# Patient Record
Sex: FEMALE | Race: WHITE
[De-identification: names, ages, dates, MRNs, and addresses within clinical notes are randomized per-mention and may not be internally consistent; named-entity substitution may affect disease eponyms.]

---

## 2018-06-01 ENCOUNTER — HOSPITAL ENCOUNTER (INPATIENT)
Dept: HOSPITAL 74 - FM/S | Age: 73
LOS: 3 days | Discharge: NURSING, REHAB OR LONG-TERM CARE | DRG: 462 | End: 2018-06-04
Attending: NURSE PRACTITIONER | Admitting: INTERNAL MEDICINE
Payer: COMMERCIAL

## 2018-06-01 VITALS — BODY MASS INDEX: 33.1 KG/M2

## 2018-06-01 DIAGNOSIS — Z88.0: ICD-10-CM

## 2018-06-01 DIAGNOSIS — M17.0: Primary | ICD-10-CM

## 2018-06-01 DIAGNOSIS — E83.42: ICD-10-CM

## 2018-06-01 DIAGNOSIS — D64.9: ICD-10-CM

## 2018-06-01 DIAGNOSIS — E78.00: ICD-10-CM

## 2018-06-01 DIAGNOSIS — I10: ICD-10-CM

## 2018-06-01 DIAGNOSIS — E03.9: ICD-10-CM

## 2018-06-01 PROCEDURE — 0SRD0J9 REPLACEMENT OF LEFT KNEE JOINT WITH SYNTHETIC SUBSTITUTE, CEMENTED, OPEN APPROACH: ICD-10-PCS | Performed by: ORTHOPAEDIC SURGERY

## 2018-06-01 PROCEDURE — 0SRC0J9 REPLACEMENT OF RIGHT KNEE JOINT WITH SYNTHETIC SUBSTITUTE, CEMENTED, OPEN APPROACH: ICD-10-PCS | Performed by: ORTHOPAEDIC SURGERY

## 2018-06-01 PROCEDURE — 8E0Y0CZ ROBOTIC ASSISTED PROCEDURE OF LOWER EXTREMITY, OPEN APPROACH: ICD-10-PCS | Performed by: ORTHOPAEDIC SURGERY

## 2018-06-01 RX ADMIN — DOCUSATE SODIUM,SENNOSIDES SCH TABLET: 50; 8.6 TABLET, FILM COATED ORAL at 21:28

## 2018-06-01 RX ADMIN — ACETAMINOPHEN SCH: 10 INJECTION, SOLUTION INTRAVENOUS at 21:00

## 2018-06-01 RX ADMIN — ACETAMINOPHEN SCH: 10 INJECTION, SOLUTION INTRAVENOUS at 16:44

## 2018-06-01 RX ADMIN — CEFAZOLIN SODIUM SCH MLS/HR: 2 SOLUTION INTRAVENOUS at 18:12

## 2018-06-01 RX ADMIN — HYDROCHLOROTHIAZIDE SCH MG: 12.5 CAPSULE ORAL at 21:28

## 2018-06-01 RX ADMIN — ATORVASTATIN CALCIUM SCH MG: 10 TABLET, FILM COATED ORAL at 21:28

## 2018-06-01 RX ADMIN — GABAPENTIN SCH MG: 300 CAPSULE ORAL at 21:28

## 2018-06-01 RX ADMIN — LISINOPRIL SCH MG: 20 TABLET ORAL at 21:28

## 2018-06-01 RX ADMIN — ACETAMINOPHEN SCH MG: 325 TABLET ORAL at 21:28

## 2018-06-01 RX ADMIN — ASPIRIN 325 MG ORAL TABLET SCH MG: 325 PILL ORAL at 21:28

## 2018-06-01 RX ADMIN — OXYCODONE HYDROCHLORIDE AND ACETAMINOPHEN SCH MG: 500 TABLET ORAL at 21:28

## 2018-06-01 RX ADMIN — OXYCODONE HYDROCHLORIDE SCH MG: 10 TABLET, FILM COATED, EXTENDED RELEASE ORAL at 21:29

## 2018-06-01 NOTE — SURG
Surgery First Assist Note


First Assist: Cresencio Mansfield PA-C


Date of Service: 06/01/18


Diagnosis: 





Osteoarthritis





Procedure: 





[place] bilateral total knee replacements


I was present for the entirety of the operative procedure. For further detail, 

please refer to operative report.








Visit type





- Case Type


Case Type: Scheduled





- New patient


This patient is new to me today: Yes


Date on this admission: 06/01/18

## 2018-06-01 NOTE — HP
History & Physical Update





- History


History: No Change





- Physical


Physical: No Change





- Assessment


Assessment: No Change





- Plan


Plan: No Change (Initial H&P is located in patient's paper chart. Her today for 

elective bilateral knee replacements (primary OA). No new complaints or 

medications since completing H&P.)

## 2018-06-01 NOTE — OP
DATE OF OPERATION:  06/01/2018

 

PREOPERATIVE DIAGNOSIS:  Bilateral knee arthritis.

 

POSTOPERATIVE DIAGNOSIS:  Bilateral knee arthritis.

 

OPERATION PERFORMED:  Bilateral total knee arthroplasty with LEYDA assistance.

 

SURGEON:  Toy Patton MD

 

ASSISTANT:  DIVYA Dupont

 

TYPE OF ANESTHESIA:  Spinal and block.

 

DISPOSITION:  Patient returned to the recovery room in stable condition.

 

COMPONENTS USED:  On both sides, we used a size 4 Abebe Triathlon femur, a size 4

Abebe Triathlon universal tibia, and a 29-mm patella.  On the left side, we used an

11-mm PS insert, and on the right side, we used a 9-mm PS insert.

 

DRAINS PLACED:  One Hemovac each side.

 

ESTIMATED BLOOD LOSS:  Less than 50 mL each side.

 

TOTAL TOURNIQUET TIME:  On the left was 119 minutes; on the right was 110 minutes.

 

PREOPERATIVE CONTRACTURE:  10 degrees on both sides.

 

FINDINGS:  Severe end-stage arthritis.

 

INDICATIONS:  Patient failed nonoperative treatment for bilateral knee arthritis and

was indicated for bilateral total knee replacement.  Preoperatively in the waiting

area as well as in the operative site, a long discussion with the patient regarding

the plan, the expected outcome, and the risks, benefits, and alternatives of surgery.

 The risks included, but were not limited to infection which may require future

surgery and removal of implants, bleeding which may require transfusion, damage to

nerves, arteries, veins, tendons, muscles, and other adjacent structures leading to

possible numbness, weakness, decreased function and/or possible need for surgical

repair.  Also discussed were the possibility of intraoperative and postoperative

fractures, implant loosening, stiffness, need for extensive therapy, and need for

revision for a variety of reasons.  We also discussed blood clots and other possible

medical complications.  We also discussed that doing both knees at the same time

places her at increased risk for several complications including blood clots,

bleeding, needing for transfusion, and difficult recovery.  All questions were

answered, and consent was obtained.

 

PROCEDURE IN DETAIL:  Patient was taken to the operating room and placed on the

operating table in the supine position.  Following induction of spinal anesthesia,

well-padded tourniquets were placed high on both sides, and both lower extremities

were prepped and draped in a sterile fashion.  A timeout was performed that confirmed

the correct sides, verified that the sites were marked, and confirmed the correct

patient and procedures to be performed, as well that the patient received the

appropriate preoperative antibiotics and tranexamic acid and was registered in the

[employer] robot.  The identical procedure was performed on both sides, only difference

being the tourniquet times noted above and the insert sides noted above.  Tourniquet

was elevated, followed by a midline incision and a medial parapatellar arthrotomy. 

The checkpoints were placed in the femur and tibia through stab incisions, and after

blunt dissection, the arrays were placed on the tibia and the femur.  The patient was

then verified and registered in the computer.  The plan was adjusted according to

patient's anatomy and alignment, and we then made all of our cuts using the [employer]

robot while protecting the surrounding soft tissues.  A laminar  was placed

sequentially in the medial and lateral joint spaces.  Posterior osteophytes, cruciate

ligaments, and menisci were all excised.  We then placed trials of our final sizes

after cutting the box, and there was good stability, range of motion, soft tissue

tension, and patellar tracking with our trials of our final sizes.  We then measured

the patella on both sides to be 23.  We cut off 8.25 using a guide.  We reconstructed

both patella with a 29-mm button and performed lateral double cuts.  When the patella

trial was in place, the patellar tracked centrally.  There was good stability, soft

tissue tension, and range of motion, and we verified our final alignment in

accordance with our preoperative plan with the robot.  The tibia was punched, and the

trials were then removed.  The wound was copiously irrigated, and sequential

cementation was performed starting with the tibia and then the femur.  Excess cement

was removed.  We inserted the final polyethylene, and it seated flush.  The knee was

brought to full extension to allow the cement to harden.  We made sure excess cement

was removed.  After a 3-minute soak with a diluted Betadine solution, the knee was

copiously irrigated.  Our injection was then injected.  A deep drain was placed.  We

checked for excess cement.  The arthrotomy was closed with number 1 Vicryl and 0

V-Loc.  With the arthrotomy closed, there was good stability, range of motion, soft

tissue tension, and the patella tracked centrally.  An anesthetic cocktail had

already been injected.  Next, 2-0 Vicryl and staples were used for the skin.  The

knee was wrapped in a dry, sterile compressive dressing.  The tourniquet was

released.  Compartments were soft at the end of the procedure.  Pulses were palpated,

and the patient was returned to the recovery room in stable condition, will be

mobilized weightbearing as tolerated.

 

WOUND CLASSIFICATION:  Clean.

 

SPECIMENS:  Bone.

 

COMPLICATIONS:  None known.

 

Of note, we removed the checkpoints and arrays prior to closure.

 

 

TOY PATTON M.D.

 

[id number]

DD: 06/01/2018 20:10

DT: 06/01/2018 20:54

Job #:  [id number]

## 2018-06-01 NOTE — OP
Operative Note





- Note:


Operative Date: 06/01/18


Pre-Operative Diagnosis: Osteoarthritis


Operation: LEYDA bilateral knee replacements


Post-Operative Diagnosis: Same as Pre-op


Surgeon: Toy Willoughby


Assistant: Cresencio Mansfield


Anesthesia: Spinal


Estimated Blood Loss (mls): 100


Drains & Tubes with Location: hemovac x2


Drains, Volume Out (mls): 400 (forrest)


Fluid Volume Replaced (mls): 2,300


Operative Report Dictated: Yes

## 2018-06-02 LAB
ALBUMIN SERPL-MCNC: 3.2 G/DL (ref 3.5–5)
ALP SERPL-CCNC: 47 U/L (ref 32–92)
ALT SERPL-CCNC: 19 U/L (ref 10–40)
ANION GAP SERPL CALC-SCNC: 8 MMOL/L (ref 8–16)
ANION GAP SERPL CALC-SCNC: 9 MMOL/L (ref 8–16)
AST SERPL-CCNC: 24 U/L (ref 10–42)
BASOPHILS # BLD: 0.2 % (ref 0–2)
BILIRUB SERPL-MCNC: 0.6 MG/DL (ref 0.2–1)
BUN SERPL-MCNC: 23 MG/DL (ref 7–18)
BUN SERPL-MCNC: 24 MG/DL (ref 7–18)
CALCIUM SERPL-MCNC: 8 MG/DL (ref 8.4–10.2)
CALCIUM SERPL-MCNC: 8.1 MG/DL (ref 8.4–10.2)
CHLORIDE SERPL-SCNC: 99 MMOL/L (ref 98–107)
CHLORIDE SERPL-SCNC: 99 MMOL/L (ref 98–107)
CO2 SERPL-SCNC: 24 MMOL/L (ref 22–28)
CO2 SERPL-SCNC: 25 MMOL/L (ref 22–28)
CREAT SERPL-MCNC: 0.9 MG/DL (ref 0.6–1.3)
CREAT SERPL-MCNC: 0.9 MG/DL (ref 0.6–1.3)
DEPRECATED RDW RBC AUTO: 13 % (ref 11.6–15.6)
DEPRECATED RDW RBC AUTO: 13.7 % (ref 11.6–15.6)
EOSINOPHIL # BLD: 0.9 % (ref 0–4.5)
GLUCOSE SERPL-MCNC: 150 MG/DL (ref 74–106)
GLUCOSE SERPL-MCNC: 152 MG/DL (ref 74–106)
HCT VFR BLD CALC: 27.4 % (ref 32.4–45.2)
HCT VFR BLD CALC: 28 % (ref 32.4–45.2)
HGB BLD-MCNC: 9.6 GM/DL (ref 10.7–15.3)
HGB BLD-MCNC: 9.6 GM/DL (ref 10.7–15.3)
LYMPHOCYTES # BLD: 12 % (ref 8–40)
MAGNESIUM SERPL-MCNC: 1.6 MG/DL (ref 1.8–2.4)
MCH RBC QN AUTO: 32.1 PG (ref 25.7–33.7)
MCH RBC QN AUTO: 32.5 PG (ref 25.7–33.7)
MCHC RBC AUTO-ENTMCNC: 34.3 G/DL (ref 32–36)
MCHC RBC AUTO-ENTMCNC: 34.9 G/DL (ref 32–36)
MCV RBC: 93.1 FL (ref 80–96)
MCV RBC: 93.6 FL (ref 80–96)
MONOCYTES # BLD AUTO: 9 % (ref 3.8–10.2)
NEUTROPHILS # BLD: 77.9 % (ref 42.8–82.8)
PLATELET # BLD AUTO: 184 K/MM3 (ref 134–434)
PLATELET # BLD AUTO: 190 K/MM3 (ref 134–434)
PMV BLD: 7.2 FL (ref 7.5–11.1)
PMV BLD: 8.3 FL (ref 7.5–11.1)
POTASSIUM SERPLBLD-SCNC: 3.7 MMOL/L (ref 3.5–5.1)
POTASSIUM SERPLBLD-SCNC: 3.8 MMOL/L (ref 3.5–5.1)
PROT SERPL-MCNC: 5.1 G/DL (ref 6.4–8.3)
RBC # BLD AUTO: 2.94 M/MM3 (ref 3.6–5.2)
RBC # BLD AUTO: 2.99 M/MM3 (ref 3.6–5.2)
SODIUM SERPL-SCNC: 132 MMOL/L (ref 136–145)
SODIUM SERPL-SCNC: 132 MMOL/L (ref 136–145)
WBC # BLD AUTO: 6.8 K/MM3 (ref 4–10.8)
WBC # BLD AUTO: 6.9 K/MM3 (ref 4–10.8)

## 2018-06-02 RX ADMIN — ACETAMINOPHEN SCH MG: 325 TABLET ORAL at 08:30

## 2018-06-02 RX ADMIN — OXYCODONE HYDROCHLORIDE SCH MG: 10 TABLET, FILM COATED, EXTENDED RELEASE ORAL at 10:04

## 2018-06-02 RX ADMIN — ACETAMINOPHEN SCH MG: 325 TABLET ORAL at 14:37

## 2018-06-02 RX ADMIN — ASPIRIN 325 MG ORAL TABLET SCH MG: 325 PILL ORAL at 10:03

## 2018-06-02 RX ADMIN — ASPIRIN 325 MG ORAL TABLET SCH MG: 325 PILL ORAL at 21:32

## 2018-06-02 RX ADMIN — ACETAMINOPHEN SCH MG: 325 TABLET ORAL at 03:30

## 2018-06-02 RX ADMIN — OXYCODONE HYDROCHLORIDE AND ACETAMINOPHEN SCH MG: 500 TABLET ORAL at 10:06

## 2018-06-02 RX ADMIN — ACETAMINOPHEN SCH: 10 INJECTION, SOLUTION INTRAVENOUS at 03:30

## 2018-06-02 RX ADMIN — VITAMIN D, TAB 1000IU (100/BT) SCH UNIT: 25 TAB at 10:06

## 2018-06-02 RX ADMIN — GABAPENTIN SCH MG: 300 CAPSULE ORAL at 21:32

## 2018-06-02 RX ADMIN — OXYCODONE HYDROCHLORIDE SCH MG: 10 TABLET, FILM COATED, EXTENDED RELEASE ORAL at 21:33

## 2018-06-02 RX ADMIN — CELECOXIB SCH MG: 200 CAPSULE ORAL at 10:03

## 2018-06-02 RX ADMIN — DOCUSATE SODIUM,SENNOSIDES SCH TABLET: 50; 8.6 TABLET, FILM COATED ORAL at 10:04

## 2018-06-02 RX ADMIN — PANTOPRAZOLE SODIUM SCH MG: 40 TABLET, DELAYED RELEASE ORAL at 10:05

## 2018-06-02 RX ADMIN — OXYCODONE HYDROCHLORIDE AND ACETAMINOPHEN SCH MG: 500 TABLET ORAL at 21:32

## 2018-06-02 RX ADMIN — LISINOPRIL SCH MG: 20 TABLET ORAL at 10:04

## 2018-06-02 RX ADMIN — ATORVASTATIN CALCIUM SCH MG: 10 TABLET, FILM COATED ORAL at 21:32

## 2018-06-02 RX ADMIN — LISINOPRIL SCH MG: 20 TABLET ORAL at 21:32

## 2018-06-02 RX ADMIN — MULTIVITAMIN TABLET SCH TAB: TABLET at 10:05

## 2018-06-02 RX ADMIN — DOCUSATE SODIUM,SENNOSIDES SCH TABLET: 50; 8.6 TABLET, FILM COATED ORAL at 21:33

## 2018-06-02 RX ADMIN — HYDROCHLOROTHIAZIDE SCH MG: 12.5 CAPSULE ORAL at 21:32

## 2018-06-02 RX ADMIN — HYDROCHLOROTHIAZIDE SCH MG: 12.5 CAPSULE ORAL at 10:04

## 2018-06-02 RX ADMIN — LEVOTHYROXINE SODIUM SCH MCG: 112 TABLET ORAL at 07:19

## 2018-06-02 RX ADMIN — ACETAMINOPHEN SCH MG: 325 TABLET ORAL at 21:31

## 2018-06-02 RX ADMIN — CEFAZOLIN SODIUM SCH MLS/HR: 2 SOLUTION INTRAVENOUS at 00:20

## 2018-06-02 RX ADMIN — GABAPENTIN SCH MG: 300 CAPSULE ORAL at 10:04

## 2018-06-02 NOTE — PN
Progress Note (short form)





- Note


Progress Note: 





Patient seen and examined.  No acute events.  Pain controlled. 


Vitals reviewed and stable


No acute distress


A and Ox3


BL drains removed by me


Dressings dry


compartments soft


NVID





A/P: POD#1 s/p bl tka


-dvt proph w asa and mechanical


-pt/ot wbat


-pain control


-dispo: likely Acute rehab at [place] monday if approved.

## 2018-06-02 NOTE — PN
Physical Exam: 


SUBJECTIVE: Patient seen and examined at the bedside.


In no acute distress





OBJECTIVE:





 Vital Signs











 Period  Temp  Pulse  Resp  BP Sys/Grewal  Pulse Ox


 


 Last 24 Hr  93.9 F-98.1 F  56-71  16-18  /46-66  








GENERAL: The patient is awake, alert, and fully oriented, in no acute distress.


HEAD: Normal with no signs of trauma.


EYES: PERRL, extraocular movements intact, sclera anicteric, conjunctiva clear. 

No ptosis. 


ENT: Ears normal, nares patent, oropharynx clear without exudates, moist mucous 

membranes.


NECK: Trachea midline, full range of motion, supple. 


LUNGS: Breath sounds equal, clear to auscultation bilaterally, no wheezes, no 

crackles, no accessory muscle use. 


HEART: Regular rate and rhythm


ABDOMEN: Soft, nontender, nondistended, normoactive bowel sounds, no guarding


EXTREMITIES: She is POD #1 for bilateral TKA.  She is awaiting rehab at [place]. 


NEUROLOGICAL: Cranial nerves II through XII grossly intact. Normal speech, gait 

not observed.


PSYCH: Normal mood, normal affect.


SKIN: Warm, dry, normal turgor, no rashes or lesions noted





 Laboratory Results - last 24 hr











  06/02/18 06/02/18 06/02/18





  08:00 08:00 09:30


 


WBC  6.8   6.9


 


RBC  2.99 L   2.94 L


 


Hgb  9.6 L   9.6 L


 


Hct  28.0 L   27.4 L


 


MCV  93.6   93.1


 


MCH  32.1   32.5


 


MCHC  34.3   34.9


 


RDW  13.7   13.0


 


Plt Count  190   184


 


MPV  7.2 L   8.3


 


Neutrophils %    77.9


 


Lymphocytes %    12.0


 


Monocytes %    9.0


 


Eosinophils %    0.9


 


Basophils %    0.2


 


Sodium   132 L 


 


Potassium   3.7 


 


Chloride   99 


 


Carbon Dioxide   25 


 


Anion Gap   8 


 


BUN   24 H 


 


Creatinine   0.9 


 


Creat Clearance w eGFR   


 


Random Glucose   152 H 


 


Calcium   8.1 L 


 


Magnesium   


 


Total Bilirubin   


 


AST   


 


ALT   


 


Alkaline Phosphatase   


 


Total Protein   


 


Albumin   














  06/02/18





  09:30


 


WBC 


 


RBC 


 


Hgb 


 


Hct 


 


MCV 


 


MCH 


 


MCHC 


 


RDW 


 


Plt Count 


 


MPV 


 


Neutrophils % 


 


Lymphocytes % 


 


Monocytes % 


 


Eosinophils % 


 


Basophils % 


 


Sodium  132 L


 


Potassium  3.8


 


Chloride  99


 


Carbon Dioxide  24


 


Anion Gap  9


 


BUN  23 H


 


Creatinine  0.9


 


Creat Clearance w eGFR  > 60


 


Random Glucose  150 H


 


Calcium  8.0 L


 


Magnesium  1.6 L


 


Total Bilirubin  0.6


 


AST  24


 


ALT  19


 


Alkaline Phosphatase  47


 


Total Protein  5.1 L


 


Albumin  3.2 L








Active Medications











Generic Name Dose Route Start Last Admin





  Trade Name Freq  PRN Reason Stop Dose Admin


 


Acetaminophen  650 mg  06/01/18 20:30  06/02/18 08:30





  Tylenol -  PO   650 mg





  Q6H ELLE   Administration





     





     





     





     


 


Al Hydroxide/Mg Hydroxide  30 ml  06/01/18 13:43  





  Mylanta Oral Suspension -  PO   





  Q4H PRN   





  DYSPEPSIA   





     





     





     


 


Ascorbic Acid  500 mg  06/01/18 22:00  06/02/18 10:06





  Vitamin C -  PO   500 mg





  BID ELLE   Administration





     





     





     





     


 


Aspirin  325 mg  06/01/18 22:00  06/02/18 10:03





  Asa -  PO   325 mg





  BID ELLE   Administration





     





     





     





     


 


Atorvastatin Calcium  10 mg  06/01/18 22:00  06/01/18 21:28





  Lipitor -  PO   10 mg





  HS ELLE   Administration





     





     





     





     


 


Celecoxib  200 mg  06/02/18 10:00  06/02/18 10:03





  Celebrex -  PO   200 mg





  DAILY ELLE   Administration





     





     





     





     


 


Cholecalciferol  2,000 unit  06/02/18 10:00  06/02/18 10:06





  Vitamin D3 -  PO   2,000 unit





  DAILY ELLE   Administration





     





     





     





     


 


Fentanyl  50 mcg  06/01/18 13:50  





  Sublimaze Injection -  IVPUSH   





  [id number] PRN   





  PAIN-PACU ORDER X 4 DOSES ONLY   





     





     





     


 


Gabapentin  300 mg  06/01/18 22:00  06/02/18 10:04





  Neurontin -  PO   300 mg





  BID ELLE   Administration





     





     





     





     


 


Hydrochlorothiazide  12.5 mg  06/01/18 22:00  06/02/18 10:04





  Hctz -  PO   12.5 mg





  BID ELLE   Administration





     





     





     





     


 


Levothyroxine Sodium 112 mcg/  137 mcg  06/02/18 07:00  06/02/18 07:19





  Levothyroxine Sodium 25 mcg  PO   137 mcg





  DAILY@0700 ELLE   Administration





     





     





     





     


 


Lisinopril  20 mg  06/01/18 22:00  06/02/18 10:04





  Prinivil  PO   20 mg





  BID ELLE   Administration





     





     





     





     


 


Metoprolol Succinate  50 mg  06/02/18 10:00  06/02/18 10:05





  Toprol Xl -  PO   50 mg





  DAILY ELLE   Administration





     





     





     





     


 


Multivitamins/Minerals/Vitamin C  1 tab  06/02/18 10:00  06/02/18 10:05





  Tab-A-Vit -  PO   1 tab





  DAILY ELLE   Administration





     





     





     





     


 


Ondansetron HCl  4 mg  06/01/18 13:43  





  Zofran Injection  IVPUSH   





  Q6H PRN   





  NAUSEA   





     





     





     


 


Oxycodone HCl  5 mg  06/01/18 13:51  06/02/18 10:05





  Roxicodone -  PO   5 mg





  Q3H PRN   Administration





  PAIN LEVEL 1-5   





     





     





     


 


Oxycodone HCl  10 mg  06/01/18 13:51  





  Roxicodone -  PO   





  Q3H PRN   





  PAIN LEVEL 6-10   





     





     





     


 


Oxycodone HCl  10 mg  06/01/18 22:00  06/02/18 10:04





  Oxycontin -  PO  06/04/18 13:52  10 mg





  BID ELLE   Administration





     





     





     





     


 


Pantoprazole Sodium  40 mg  06/02/18 10:00  06/02/18 10:05





  Protonix -  PO   40 mg





  DAILY ELLE   Administration





     





     





     





     


 


Senna/Docusate Sodium  1 tablet  06/01/18 22:00  06/02/18 10:04





  Pericolace -  PO   1 tablet





  BID ELLE   Administration





     





     





     





     











ASSESSMENT/PLAN:





Patient is a 73 year old female with a significant past medical history of 

hypertension, hyperthyroidism, hypercholesteroemia.  She is POD #1 for 

bilateral TKA.  She is awaiting rehab at [place]. 





Ortho:


Bilateral TKA POD #1


Physical therapy


Surgery follow up


Monitor pain


Bowel regimen





Hypertension, controlled


Toprol, HCTZ, Lisinopril





Hyperthyroidism, chronic


Synthroid





F.E.N:


Fluids: tolerating PO


Electrolytes: hypomagnesium, repleted


Nutrition: low salt

## 2018-06-03 LAB
ALBUMIN SERPL-MCNC: 2.9 G/DL (ref 3.5–5)
ALP SERPL-CCNC: 50 U/L (ref 32–92)
ALT SERPL-CCNC: 14 U/L (ref 10–40)
ANION GAP SERPL CALC-SCNC: 8 MMOL/L (ref 8–16)
AST SERPL-CCNC: 21 U/L (ref 10–42)
BASOPHILS # BLD: 0.4 % (ref 0–2)
BILIRUB SERPL-MCNC: 0.5 MG/DL (ref 0.2–1)
BUN SERPL-MCNC: 20 MG/DL (ref 7–18)
CALCIUM SERPL-MCNC: 7.9 MG/DL (ref 8.4–10.2)
CHLORIDE SERPL-SCNC: 100 MMOL/L (ref 98–107)
CO2 SERPL-SCNC: 27 MMOL/L (ref 22–28)
CREAT SERPL-MCNC: 0.9 MG/DL (ref 0.6–1.3)
DEPRECATED RDW RBC AUTO: 13.1 % (ref 11.6–15.6)
EOSINOPHIL # BLD: 0.4 % (ref 0–4.5)
GLUCOSE SERPL-MCNC: 117 MG/DL (ref 74–106)
HCT VFR BLD CALC: 26.1 % (ref 32.4–45.2)
HGB BLD-MCNC: 9 GM/DL (ref 10.7–15.3)
LYMPHOCYTES # BLD: 13.2 % (ref 8–40)
MAGNESIUM SERPL-MCNC: 1.8 MG/DL (ref 1.8–2.4)
MCH RBC QN AUTO: 32.2 PG (ref 25.7–33.7)
MCHC RBC AUTO-ENTMCNC: 34.6 G/DL (ref 32–36)
MCV RBC: 93 FL (ref 80–96)
MONOCYTES # BLD AUTO: 9.2 % (ref 3.8–10.2)
NEUTROPHILS # BLD: 76.8 % (ref 42.8–82.8)
PLATELET # BLD AUTO: 211 K/MM3 (ref 134–434)
PMV BLD: 8.6 FL (ref 7.5–11.1)
POTASSIUM SERPLBLD-SCNC: 3.7 MMOL/L (ref 3.5–5.1)
PROT SERPL-MCNC: 5.1 G/DL (ref 6.4–8.3)
RBC # BLD AUTO: 2.81 M/MM3 (ref 3.6–5.2)
SODIUM SERPL-SCNC: 135 MMOL/L (ref 136–145)
WBC # BLD AUTO: 8.9 K/MM3 (ref 4–10.8)

## 2018-06-03 RX ADMIN — LISINOPRIL SCH MG: 20 TABLET ORAL at 10:06

## 2018-06-03 RX ADMIN — LEVOTHYROXINE SODIUM SCH MCG: 112 TABLET ORAL at 06:08

## 2018-06-03 RX ADMIN — MULTIVITAMIN TABLET SCH TAB: TABLET at 10:06

## 2018-06-03 RX ADMIN — ACETAMINOPHEN SCH MG: 325 TABLET ORAL at 21:44

## 2018-06-03 RX ADMIN — PANTOPRAZOLE SODIUM SCH MG: 40 TABLET, DELAYED RELEASE ORAL at 10:06

## 2018-06-03 RX ADMIN — ACETAMINOPHEN SCH MG: 325 TABLET ORAL at 06:08

## 2018-06-03 RX ADMIN — ACETAMINOPHEN SCH MG: 325 TABLET ORAL at 08:30

## 2018-06-03 RX ADMIN — HYDROCHLOROTHIAZIDE SCH MG: 12.5 CAPSULE ORAL at 10:06

## 2018-06-03 RX ADMIN — ACETAMINOPHEN SCH MG: 325 TABLET ORAL at 14:35

## 2018-06-03 RX ADMIN — OXYCODONE HYDROCHLORIDE SCH MG: 10 TABLET, FILM COATED, EXTENDED RELEASE ORAL at 10:06

## 2018-06-03 RX ADMIN — ASPIRIN 325 MG ORAL TABLET SCH MG: 325 PILL ORAL at 21:44

## 2018-06-03 RX ADMIN — DOCUSATE SODIUM,SENNOSIDES SCH TABLET: 50; 8.6 TABLET, FILM COATED ORAL at 21:44

## 2018-06-03 RX ADMIN — CELECOXIB SCH MG: 200 CAPSULE ORAL at 10:06

## 2018-06-03 RX ADMIN — LISINOPRIL SCH MG: 20 TABLET ORAL at 21:44

## 2018-06-03 RX ADMIN — DOCUSATE SODIUM,SENNOSIDES SCH TABLET: 50; 8.6 TABLET, FILM COATED ORAL at 10:06

## 2018-06-03 RX ADMIN — OXYCODONE HYDROCHLORIDE AND ACETAMINOPHEN SCH MG: 500 TABLET ORAL at 21:44

## 2018-06-03 RX ADMIN — GABAPENTIN SCH MG: 300 CAPSULE ORAL at 21:44

## 2018-06-03 RX ADMIN — VITAMIN D, TAB 1000IU (100/BT) SCH UNIT: 25 TAB at 10:07

## 2018-06-03 RX ADMIN — OXYCODONE HYDROCHLORIDE AND ACETAMINOPHEN SCH MG: 500 TABLET ORAL at 10:07

## 2018-06-03 RX ADMIN — ATORVASTATIN CALCIUM SCH MG: 10 TABLET, FILM COATED ORAL at 21:44

## 2018-06-03 RX ADMIN — OXYCODONE HYDROCHLORIDE SCH: 10 TABLET, FILM COATED, EXTENDED RELEASE ORAL at 21:44

## 2018-06-03 RX ADMIN — GABAPENTIN SCH MG: 300 CAPSULE ORAL at 10:06

## 2018-06-03 RX ADMIN — HYDROCHLOROTHIAZIDE SCH MG: 12.5 CAPSULE ORAL at 21:44

## 2018-06-03 RX ADMIN — ASPIRIN 325 MG ORAL TABLET SCH MG: 325 PILL ORAL at 10:06

## 2018-06-03 NOTE — PN
Progress Note (short form)





- Note


Progress Note: 





Patient seen and examined.  No acute events.  Pain controlled. 


Vitals and labs reviewed and stable


No acute distress


A and Ox3


Dressings dry


compartments soft


NVID





A/P: POD#2 s/p bl tka


-dvt proph w asa and mechanical


-pt/ot wbat


-pain control


-dispo: likely Acute rehab at [facility] monday if approved.

## 2018-06-03 NOTE — PN
Physical Exam: 


SUBJECTIVE: Patient seen and examined


 Pt reports bilateral knee pain well controlled with pain meds.





OBJECTIVE:





 Vital Signs











 Period  Temp  Pulse  Resp  BP Sys/Grewal  Pulse Ox


 


 Last 24 Hr  98.0 F-98.3 F  74-81  16-18  /47-49  95-99











GENERAL: The patient is awake, alert, and fully oriented, in no acute distress, 

OOB 


HEAD: Normal with no signs of trauma.


EYES: PERRL, extraocular movements intact, sclera anicteric, conjunctiva clear. 

No ptosis. 


ENT: Ears normal, nares patent, oropharynx clear without exudates, moist mucous 


membranes.


NECK: Trachea midline, full range of motion, supple. 


LUNGS: Breath sounds equal, clear to auscultation bilaterally, no wheezes, no 

crackles, no 


accessory muscle use. 


HEART: Regular rate and rhythm, S1, S2 without murmur, rub or gallop.


ABDOMEN: Soft, non-tender, non-distended, normoactive bowel sounds, no guarding

, no 


rebound, no hepatosplenomegaly, no masses.


EXTREMITIES: 2+ pulses, warm, well-perfused, no edema, s/p bilateral knee 

replacement,dsg intact 


NEUROLOGICAL: Cranial nerves II through XII grossly intact. Normal speech, gait 

not 


observed.


PSYCH: Normal mood, normal affect.


SKIN: Warm, dry, normal turgor, no rashes or lesions noted














 Laboratory Results - last 24 hr











  06/02/18 06/02/18 06/02/18





  08:00 09:30 09:30


 


WBC   6.9 


 


RBC   2.94 L 


 


Hgb   9.6 L 


 


Hct   27.4 L 


 


MCV   93.1 


 


MCH   32.5 


 


MCHC   34.9 


 


RDW   13.0 


 


Plt Count   184 


 


MPV   8.3 


 


Neutrophils %   77.9 


 


Lymphocytes %   12.0 


 


Monocytes %   9.0 


 


Eosinophils %   0.9 


 


Basophils %   0.2 


 


Sodium  132 L   132 L


 


Potassium  3.7   3.8


 


Chloride  99   99


 


Carbon Dioxide  25   24


 


Anion Gap  8   9


 


BUN  24 H   23 H


 


Creatinine  0.9   0.9


 


Creat Clearance w eGFR    > 60


 


Random Glucose  152 H   150 H


 


Calcium  8.1 L   8.0 L


 


Magnesium    1.6 L


 


Total Bilirubin    0.6


 


AST    24


 


ALT    19


 


Alkaline Phosphatase    47


 


Total Protein    5.1 L


 


Albumin    3.2 L








Active Medications











Generic Name Dose Route Start Last Admin





  Trade Name Freq  PRN Reason Stop Dose Admin


 


Acetaminophen  650 mg  06/01/18 20:30  06/03/18 06:08





  Tylenol -  PO   650 mg





  Q6H ELLE   Administration





     





     





     





     


 


Al Hydroxide/Mg Hydroxide  30 ml  06/01/18 13:43  





  Mylanta Oral Suspension -  PO   





  Q4H PRN   





  DYSPEPSIA   





     





     





     


 


Ascorbic Acid  500 mg  06/01/18 22:00  06/02/18 21:32





  Vitamin C -  PO   500 mg





  BID ELLE   Administration





     





     





     





     


 


Aspirin  325 mg  06/01/18 22:00  06/02/18 21:32





  Asa -  PO   325 mg





  BID ELLE   Administration





     





     





     





     


 


Atorvastatin Calcium  10 mg  06/01/18 22:00  06/02/18 21:32





  Lipitor -  PO   10 mg





  HS ELLE   Administration





     





     





     





     


 


Celecoxib  200 mg  06/02/18 10:00  06/02/18 10:03





  Celebrex -  PO   200 mg





  DAILY ELLE   Administration





     





     





     





     


 


Cholecalciferol  2,000 unit  06/02/18 10:00  06/02/18 10:06





  Vitamin D3 -  PO   2,000 unit





  DAILY ELLE   Administration





     





     





     





     


 


Fentanyl  50 mcg  06/01/18 13:50  





  Sublimaze Injection -  IVPUSH   





  [id number] PRN   





  PAIN-PACU ORDER X 4 DOSES ONLY   





     





     





     


 


Gabapentin  300 mg  06/01/18 22:00  06/02/18 21:32





  Neurontin -  PO   300 mg





  BID ELLE   Administration





     





     





     





     


 


Hydrochlorothiazide  12.5 mg  06/01/18 22:00  06/02/18 21:32





  Hctz -  PO   12.5 mg





  BID ELLE   Administration





     





     





     





     


 


Levothyroxine Sodium 112 mcg/  137 mcg  06/02/18 07:00  06/03/18 06:08





  Levothyroxine Sodium 25 mcg  PO   137 mcg





  DAILY@0700 ELLE   Administration





     





     





     





     


 


Lisinopril  20 mg  06/01/18 22:00  06/02/18 21:32





  Prinivil  PO   20 mg





  BID ELLE   Administration





     





     





     





     


 


Metoprolol Succinate  50 mg  06/02/18 10:00  06/02/18 10:05





  Toprol Xl -  PO   50 mg





  DAILY ELLE   Administration





     





     





     





     


 


Multivitamins/Minerals/Vitamin C  1 tab  06/02/18 10:00  06/02/18 10:05





  Tab-A-Vit -  PO   1 tab





  DAILY ELLE   Administration





     





     





     





     


 


Ondansetron HCl  4 mg  06/01/18 13:43  





  Zofran Injection  IVPUSH   





  Q6H PRN   





  NAUSEA   





     





     





     


 


Oxycodone HCl  5 mg  06/01/18 13:51  06/02/18 10:05





  Roxicodone -  PO   5 mg





  Q3H PRN   Administration





  PAIN LEVEL 1-5   





     





     





     


 


Oxycodone HCl  10 mg  06/01/18 13:51  





  Roxicodone -  PO   





  Q3H PRN   





  PAIN LEVEL 6-10   





     





     





     


 


Oxycodone HCl  10 mg  06/01/18 22:00  06/02/18 21:33





  Oxycontin -  PO  06/04/18 13:52  10 mg





  BID ELLE   Administration





     





     





     





     


 


Pantoprazole Sodium  40 mg  06/02/18 10:00  06/02/18 10:05





  Protonix -  PO   40 mg





  DAILY ELLE   Administration





     





     





     





     


 


Senna/Docusate Sodium  1 tablet  06/01/18 22:00  06/02/18 21:33





  Pericolace -  PO   1 tablet





  BID ELLE   Administration





     





     





     





     











ASSESSMENT/PLAN:





Patient is a 73 year old female with a significant past medical history of 

hypertension, hyperthyroidism, hypercholesteroemia, s/p bilateral lbee 

replacement, POD #2. 





Ortho:


* S/p bilateral TKA POD #2


Physical therapy


 sx following 


 pain control 


Bowel regimen





*Hypertension,- BP controlled


- will cont on Toprol, HCTZ, Lisinopril





*Hyperthyroidism, chronic


- will cont on Synthroid





* HDL- will cotn on Statin 





* Electrolytes imbalance 


- will f/u NA, mg levels 








* Anemia ? chronic 


- will f/u on CBC


- will check Fe studies 


- stool OB 





*F.E.N:


Fluids: tolerating PO





Nutrition: low salt


Dispo:  Awaiting for rehab at [facility], likely tomorrow. 








Visit type





- Emergency Visit


Emergency Visit: Yes


ED Registration Date: 06/01/18


Care time: The patient presented to the Emergency Department on the above date 

and was hospitalized for further evaluation of their emergent condition.





- New Patient


This patient is new to me today: Yes


Date on this admission: 06/03/18





- Critical Care


Critical Care patient: No

## 2018-06-04 VITALS — TEMPERATURE: 98.1 F | SYSTOLIC BLOOD PRESSURE: 92 MMHG | HEART RATE: 90 BPM | DIASTOLIC BLOOD PRESSURE: 40 MMHG

## 2018-06-04 RX ADMIN — HYDROCHLOROTHIAZIDE SCH: 12.5 CAPSULE ORAL at 09:44

## 2018-06-04 RX ADMIN — VITAMIN D, TAB 1000IU (100/BT) SCH UNIT: 25 TAB at 09:42

## 2018-06-04 RX ADMIN — DOCUSATE SODIUM,SENNOSIDES SCH TABLET: 50; 8.6 TABLET, FILM COATED ORAL at 09:43

## 2018-06-04 RX ADMIN — OXYCODONE HYDROCHLORIDE AND ACETAMINOPHEN SCH MG: 500 TABLET ORAL at 09:43

## 2018-06-04 RX ADMIN — PANTOPRAZOLE SODIUM SCH MG: 40 TABLET, DELAYED RELEASE ORAL at 09:43

## 2018-06-04 RX ADMIN — MULTIVITAMIN TABLET SCH TAB: TABLET at 09:43

## 2018-06-04 RX ADMIN — LEVOTHYROXINE SODIUM SCH MCG: 112 TABLET ORAL at 06:19

## 2018-06-04 RX ADMIN — OXYCODONE HYDROCHLORIDE SCH: 10 TABLET, FILM COATED, EXTENDED RELEASE ORAL at 09:49

## 2018-06-04 RX ADMIN — ACETAMINOPHEN SCH MG: 325 TABLET ORAL at 15:32

## 2018-06-04 RX ADMIN — LISINOPRIL SCH: 20 TABLET ORAL at 09:49

## 2018-06-04 RX ADMIN — ACETAMINOPHEN SCH MG: 325 TABLET ORAL at 02:08

## 2018-06-04 RX ADMIN — ASPIRIN 325 MG ORAL TABLET SCH MG: 325 PILL ORAL at 09:42

## 2018-06-04 RX ADMIN — GABAPENTIN SCH MG: 300 CAPSULE ORAL at 09:43

## 2018-06-04 RX ADMIN — CELECOXIB SCH MG: 200 CAPSULE ORAL at 09:42

## 2018-06-04 RX ADMIN — ACETAMINOPHEN SCH MG: 325 TABLET ORAL at 08:40

## 2018-06-04 NOTE — PN
Progress Note (short form)





- Note


Progress Note: 





Patient Seen.  POD# 3 after Bilat TKR [place] under spinal anesthesia + Bilat 

Adductor canal and selective Tibial nerve blocks. NRS pain at 2-3/10. No sig N/V

/pruritis/sedation.  Tolerting po analgesics and PT well.  No complaints or 

obvious complications.  Likely d/c to [facility] for rehab later today. 


Please recall prn.

## 2018-06-04 NOTE — DS
Physical Exam: 


SUBJECTIVE: Patient seen and examined, patient exam was very throughout nurses 

station with physical therapy and walker reports feeling well denies any 

paresthesia to the lower extremities patient denies any dizziness chest pain or 

shortness of breath





OBJECTIVE  patient is a 73-year-old female with a past medical history of 

hypertension, hypothyroidism, hypercholesteremia, and degenerative joint 

disease. Patient is status post bilateral knee replacement postoperative day 3, 

spinal anesthesia Dr. Jamison. 





 Vital Signs











 Period  Temp  Pulse  Resp  BP Sys/Grewal  Pulse Ox


 


 Last 24 Hr  98.5 F-98.7 F  56-81  18-18  /43-48  94-94








PHYSICAL EXAM





GENERAL: The patient is awake, alert, and fully oriented, in no acute distress.


HEAD: Normal with no signs of trauma.


EYES: PERRL, extraocular movements intact, sclera anicteric, conjunctiva clear. 


ENT: Ears normal, nares patent, oropharynx clear without exudates, moist mucous 

membranes.


NECK: Trachea midline, full range of motion, supple. 


LUNGS: Breath sounds equal, clear to auscultation bilaterally, no wheezes, no 

crackles, no accessory muscle use. 


HEART: Regular rate and rhythm, S1, S2 without murmur, rub or gallop.


ABDOMEN: Soft, nontender, nondistended, normoactive bowel sounds, no guarding, 

no rebound, no hepatosplenomegaly, no masses.


EXTREMITIES: 2+ pulses, warm, well-perfused, no edema. 


LEFT LOWER EXTREMITY: Aquacel dressing, dried blood noted, less than 3 second 

capillary refill +3 pedal Pulse, scd/david 


RIGHT LOWER EXTREMITY: Aguacel dressing, dried blood noted, less than 3 second 

capillary refill +3 pedal pulse, SCD/David


NEUROLOGICAL: Cranial nerves II through XII grossly intact. Normal speech, gait 

not observed.


PSYCH: Normal mood, normal affect.


SKIN: Warm, dry, normal turgor, no rashes or lesions noted.





LABS


 Laboratory Results - last 24 hr











  06/03/18





  06:00


 


Ferritin  200.284





 CBC











WBC  8.9 K/mm3 (4.0-10.8)   06/03/18  06:45    


 


RBC  2.81 M/mm3 (3.60-5.2)  L  06/03/18  06:45    


 


Hgb  9.0 GM/dl (10.7-15.3)  L  06/03/18  06:45    


 


Hct  26.1 % (32.4-45.2)  L  06/03/18  06:45    


 


MCV  93.0 fl (80-96)   06/03/18  06:45    


 


MCH  32.2 pg (25.7-33.7)   06/03/18  06:45    


 


MCHC  34.6 g/dl (32.0-36.0)   06/03/18  06:45    


 


RDW  13.1 % (11.6-15.6)   06/03/18  06:45    


 


Plt Count  211 K/MM3 (134-434)   06/03/18  06:45    


 


MPV  8.6 fl (7.5-11.1)   06/03/18  06:45    


 


Neutrophils %  76.8 % (42.8-82.8)   06/03/18  06:45    


 


Lymphocytes %  13.2 % (8-40)   06/03/18  06:45    


 


Monocytes %  9.2 % (3.8-10.2)   06/03/18  06:45    


 


Eosinophils %  0.4 % (0-4.5)   06/03/18  06:45    


 


Basophils %  0.4 % (0-2.0)   06/03/18  06:45    








 CMP











Sodium  135 mmol/L (136-145)  L  06/03/18  07:00    


 


Potassium  3.7 mmol/L (3.5-5.1)   06/03/18  07:00    


 


Chloride  100 mmol/L ()   06/03/18  07:00    


 


Carbon Dioxide  27 mmol/L (22-28)   06/03/18  07:00    


 


Anion Gap  8  (8-16)   06/03/18  07:00    


 


BUN  20 mg/dl (7-18)  H  06/03/18  07:00    


 


Creatinine  0.9 mg/dl (0.6-1.3)   06/03/18  07:00    


 


Creat Clearance w eGFR  > 60  (>60)   06/03/18  07:00    


 


Random Glucose  117 mg/dl ()  H D 06/03/18  07:00    


 


Calcium  7.9 mg/dl (8.4-10.2)  L  06/03/18  07:00    


 


Magnesium  1.8 mg/dL (1.8-2.4)   06/03/18  07:00    


 


Ferritin  200.284 ng/ml (6.9-282.5)   06/03/18  06:00    


 


Total Bilirubin  0.5 mg/dl (0.2-1.0)   06/03/18  07:00    


 


AST  21 U/L (10-42)   06/03/18  07:00    


 


ALT  14 U/L (10-40)  D 06/03/18  07:00    


 


Alkaline Phosphatase  50 U/L (32-92)   06/03/18  07:00    


 


Total Protein  5.1 g/dl (6.4-8.3)  L  06/03/18  07:00    


 


Albumin  2.9 g/dl (3.5-5.0)  L  06/03/18  07:00    














HOSPITAL COURSE:


    Patient was admitted  to the medical surgical unit after bilateral total 

knee replacement, 05/31/2018.  patient ambulated on postoperative day 1 with 

physical therapy, restortive services was recommended.  bilateral drains 

removed by the orthopedist on 06/02/2018.  hemoglobin 9.0 stable.  patient has 

a past medical history of hypertension, BP labile, patient is asymptomatic, 

Toprol hydrochlorothiazide and lisinopril held.  patient has a past medical 

history hypothyroidism,synthroid  continued throughout admission.





PLAN


- transfer to [place] for short term rehab














Date of Admission:06/01/18





Date of Discharge: 06/04/18











Minutes to complete discharge: 45





Discharge Summary


Reason For Visit: OSTEOARTHRITIS BILATERAL KNEE


Condition: Improved





- Instructions


Diet, Activity, Other Instructions: 


Dr. Willoughby's - Knee Replacement Instructions





See his instructions that will be printed out prior too your discharge.


Referrals: 


Toy Willoughby MD [Staff Physician] - 


Disposition: PHYSICAL REHABILATION FACILITY





- Home Medications


Comprehensive Discharge Medication List: 


Ambulatory Orders





Ascorbic Acid [Vitamin C] 1,000 mg PO DAILY 05/24/18 


Cholecalciferol (Vitamin D3) [Vitamin D3] 2,000 unit PO DAILY 05/24/18 


Levothyroxine Sodium [Synthroid] 137 mcg PO DAILY 05/24/18 


Lisinopril/Hydrochlorothiazide [Lisinopril-Hctz 20-12.5 mg Tab] 1 each PO BID 05 /24/18 


Metoprolol Succinate [Toprol Xl] 50 mg PO DAILY 05/24/18 


Multivit-Min/Iron/Folic/Lutein [Centrum Silver Women Tablet] 1 each PO DAILY 05/ 24/18 


Simvastatin 20 mg PO HS 05/24/18 


Ubidecarenone/Vit E Acet [Co Q-10 100 mg Softgel] 1 each PO DAILY 05/24/18 








This patient is new to me today: Yes


Date on this admission: 06/04/18


Emergency Visit: No


Critical Care patient: No





- Discharge Referral


Referred to [facility] No

## 2018-06-05 ENCOUNTER — HOSPITAL ENCOUNTER (OUTPATIENT)
Dept: HOSPITAL 74 - FER | Age: 73
Setting detail: OBSERVATION
LOS: 1 days | Discharge: NURSING, REHAB OR LONG-TERM CARE | End: 2018-06-06
Attending: NURSE PRACTITIONER | Admitting: INTERNAL MEDICINE
Payer: COMMERCIAL

## 2018-06-05 VITALS — BODY MASS INDEX: 35.7 KG/M2

## 2018-06-05 DIAGNOSIS — E78.5: ICD-10-CM

## 2018-06-05 DIAGNOSIS — E87.1: ICD-10-CM

## 2018-06-05 DIAGNOSIS — E03.9: ICD-10-CM

## 2018-06-05 DIAGNOSIS — D64.89: Primary | ICD-10-CM

## 2018-06-05 DIAGNOSIS — Z96.653: ICD-10-CM

## 2018-06-05 DIAGNOSIS — I10: ICD-10-CM

## 2018-06-05 DIAGNOSIS — E87.6: ICD-10-CM

## 2018-06-05 DIAGNOSIS — Z85.828: ICD-10-CM

## 2018-06-05 LAB
ALBUMIN SERPL-MCNC: 2.7 G/DL (ref 3.5–5)
ALP SERPL-CCNC: 49 U/L (ref 32–92)
ALT SERPL-CCNC: 17 U/L (ref 10–40)
ANION GAP SERPL CALC-SCNC: 7 MMOL/L (ref 8–16)
AST SERPL-CCNC: 30 U/L (ref 10–42)
BASOPHILS # BLD: 0.3 % (ref 0–2)
BASOPHILS # BLD: 0.4 % (ref 0–2)
BILIRUB SERPL-MCNC: < 0.5 MG/DL (ref 0.2–1)
BUN SERPL-MCNC: 24 MG/DL (ref 7–18)
CALCIUM SERPL-MCNC: 7.5 MG/DL (ref 8.4–10.2)
CHLORIDE SERPL-SCNC: 99 MMOL/L (ref 98–107)
CO2 SERPL-SCNC: 24 MMOL/L (ref 22–28)
CREAT SERPL-MCNC: 1 MG/DL (ref 0.6–1.3)
DEPRECATED RDW RBC AUTO: 13.2 % (ref 11.6–15.6)
DEPRECATED RDW RBC AUTO: 13.3 % (ref 11.6–15.6)
EOSINOPHIL # BLD: 0.5 % (ref 0–4.5)
EOSINOPHIL # BLD: 1.2 % (ref 0–4.5)
GLUCOSE SERPL-MCNC: 122 MG/DL (ref 74–106)
HCT VFR BLD CALC: 21.5 % (ref 32.4–45.2)
HCT VFR BLD CALC: 22 % (ref 32.4–45.2)
HGB BLD-MCNC: 7.4 GM/DL (ref 10.7–15.3)
HGB BLD-MCNC: 7.6 GM/DL (ref 10.7–15.3)
INR BLD: 1.08 (ref 0.82–1.09)
LYMPHOCYTES # BLD: 12.8 % (ref 8–40)
LYMPHOCYTES # BLD: 19.5 % (ref 8–40)
MCH RBC QN AUTO: 31.9 PG (ref 25.7–33.7)
MCH RBC QN AUTO: 32.2 PG (ref 25.7–33.7)
MCHC RBC AUTO-ENTMCNC: 34.3 G/DL (ref 32–36)
MCHC RBC AUTO-ENTMCNC: 34.6 G/DL (ref 32–36)
MCV RBC: 92.9 FL (ref 80–96)
MCV RBC: 93.1 FL (ref 80–96)
MONOCYTES # BLD AUTO: 10.1 % (ref 3.8–10.2)
MONOCYTES # BLD AUTO: 7.8 % (ref 3.8–10.2)
NEUTROPHILS # BLD: 68.8 % (ref 42.8–82.8)
NEUTROPHILS # BLD: 78.6 % (ref 42.8–82.8)
PLATELET # BLD AUTO: 267 K/MM3 (ref 134–434)
PLATELET # BLD AUTO: 278 K/MM3 (ref 134–434)
PMV BLD: 7.4 FL (ref 7.5–11.1)
PMV BLD: 7.5 FL (ref 7.5–11.1)
POTASSIUM SERPLBLD-SCNC: 3.2 MMOL/L (ref 3.5–5.1)
PROT SERPL-MCNC: 5.4 G/DL (ref 6.4–8.3)
PT PNL PPP: 12.1 SEC (ref 10.2–13)
RBC # BLD AUTO: 2.31 M/MM3 (ref 3.6–5.2)
RBC # BLD AUTO: 2.37 M/MM3 (ref 3.6–5.2)
SODIUM SERPL-SCNC: 130 MMOL/L (ref 136–145)
WBC # BLD AUTO: 6.3 K/MM3 (ref 4–10.8)
WBC # BLD AUTO: 6.9 K/MM3 (ref 4–10.8)

## 2018-06-05 PROCEDURE — G0378 HOSPITAL OBSERVATION PER HR: HCPCS

## 2018-06-05 RX ADMIN — ACETAMINOPHEN SCH MG: 325 TABLET ORAL at 21:40

## 2018-06-05 NOTE — PDOC
History of Present Illness





- General


Chief Complaint: Revisit, Lab Variance


Stated Complaint: ANEMIA


Time Seen by Provider: 06/05/18 13:17


History Source: Patient


Exam Limitations: No Limitations





- History of Present Illness


Initial Comments: 





06/05/18 14:12


73y F hx of DM, HTN, s/p rahul knee surgery b/l, d/[facility] today with anemia. The pt states she has been doing ok 

since dischage, progressing well with her rehab, although she occasionally 

feels palpitations when she is walking during rehab and feels alittle 

lightheaded after sitting down. she densi any cp, sob, abd pain, n/v, vision 

changes, numbness/tingling/weakness, diarrhea, bpr, dysuria, cough.  pt denies 

any increased swelling of her legs.  


pt does note that during her hositalzation her bp was alittle on ht elow side, 

her hbg prior to discharge was 9.0 and was at7.0 when check this morning x 2.


no prior history of anemia


not on a/c











Past History





- Past Medical History


Allergies/Adverse Reactions: 


 Allergies











Allergy/AdvReac Type Severity Reaction Status Date / Time


 


adhesive tape Allergy Intermediate Rash Verified 06/05/18 13:18


 


Penicillins Allergy Intermediate Rash Verified 06/05/18 13:18


 


Sulfa (Sulfonamide Allergy Intermediate Rash Verified 06/05/18 13:18





Antibiotics)     











Home Medications: 


Ambulatory Orders





Ascorbic Acid [Vitamin C] 1,000 mg PO DAILY 05/24/18 


Cholecalciferol (Vitamin D3) [Vitamin D3] 2,000 unit PO DAILY 05/24/18 


Levothyroxine Sodium [Synthroid] 137 mcg PO DAILY 05/24/18 


Metoprolol Succinate [Toprol Xl] 50 mg PO DAILY 05/24/18 


Multivit-Min/Iron/Folic/Lutein [Centrum Silver Women Tablet] 1 each PO DAILY 05/ 24/18 


Simvastatin 20 mg PO HS 05/24/18 


Ubidecarenone/Vit E Acet [Co Q-10 100 mg Softgel] 1 each PO DAILY 05/24/18 


Acetaminophen [Tylenol .Regular Strength -] 650 mg PO Q6H  tablet 06/04/18 


Ascorbic Acid [Vitamin C -] 500 mg PO BID  tablet 06/04/18 


Aspirin [ASA -] 325 mg PO BID #60 tablet 06/04/18 


Pantoprazole Sodium [Protonix -] 40 mg PO DAILY #30 tablet.ec 06/04/18 


oxyCODONE HCL [Roxicodone -] 5 mg PO Q6H PRN #0 tablet MDD 4 06/04/18 








Anemia: No


Asthma: No


Cancer: Yes (SKIN)


Cardiac Disorders: No


CVA: No


COPD: No


CHF: No


Dementia: No


Diabetes: No


GI Disorders: No


 Disorders: No


HTN: Yes


Hypercholesterolemia: Yes


Liver Disease: No


Seizures: No


Thyroid Disease: Yes





- Surgical History


Abdominal Surgery: No


Appendectomy: No


Cardiac Surgery: No


Cholecystectomy: No


Lung Surgery: No


Neurologic Surgery: No


Orthopedic Surgery: No





- Suicide/Smoking/Psychosocial Hx


Smoking History: Never smoked


Have you smoked in the past 12 months: No


Information on smoking cessation initiated: No


Hx Alcohol Use: No


Drug/Substance Use Hx: No


Substance Use Type: Alcohol


Hx Substance Use Treatment: No





**Review of Systems





- Review of Systems


Able to Perform ROS?: Yes


Comments:: 





06/05/18 14:17


Constitutional - no reported Fever, Chills, 


HEENT: no reported vision changes, sore throat


Respiratory: no reported cough, sob, hemoptysis


Cardiac: +palpitations ,  light headedness, no reported chest pain, leg swelling


Abd/GI: no reported abd pain, nausea, vomiting, blood per rectum, melena, 

diarrhea


: no reported dysuria, frequency, discharge


Musculskelatal - no reported back pain, joint swelling


skin - no reported bruising, erythema, rash


neurological: no reported headache, numbness, focal weakness, tingling, ataxia, 


hematologic: no reported  easy bruising, easy bleeding








*Physical Exam





- Vital Signs


 Last Vital Signs











Temp Pulse Resp BP Pulse Ox


 


 99 F   96 H  20   118/54   99 


 


 06/05/18 13:14  06/05/18 13:14  06/05/18 13:14  06/05/18 13:14  06/05/18 13:14














- Physical Exam


Comments: 





06/05/18 14:18


GENERAL: The patient is awake, alert, and fully oriented, Nontoxic - in no 

acute distress.


HEAD: Normocephalic, atraumatic.


EYES: extraocular movements intact, sclera anicteric, conjunctiva clear.


ENT: Normal voice,  Moist mucous membranes.


NECK: Normal range of motion, supple


LUNGS: Breath sounds equal, clear to auscultation bilaterally.  No wheezes, no 

rhonchi, no rales.


HEART: Regular rate and rhythm, normal S1 and S2 without murmur, rub or gallop.


ABDOMEN: Soft, nontender, normoactive bowel sounds.  No guarding, no rebound.  

No CVA tenderness


EXTREMITIES: mild edema b/l, mild erythema on medial portions o fknee w/o 

induration/warmth, +


NEUROLOGICAL: No facial assymetry, Normal speech, 


PSYCH: Normal mood, normal affect.


SKIN: Warm, Dry, normal turgor,





**Heart Score/ECG Review





- ECG Impressions


Comment:: 





06/05/18 17:54


Twelve-lead EKG was performed and reviewed by me. 


There is normal sinus rhythm with a rate of 103


Q-wave and inverted T-wave in lead 3


Nonspecific T wave changes





ED Treatment Course





- LABORATORY


CBC & Chemistry Diagram: 


 06/05/18 14:17





 06/05/18 14:17





Medical Decision Making





- Medical Decision Making





06/05/18 14:19


73y F hx of htn, hl, recent b/l rahul knee surgery sent from rehab for anemia





will recheck her lab work





if low will transfuse








06/05/18 15:58


labs reviewed


noted for hbg of 7.6


bp stable


k low - will replete





call put out to dr. mendez, awaiting call back


will defer transfusion at this time. will place in obs for repeat blood work


case dw KATHERINE naima


will place in obse tele under dr. padilla service





Case discussed in detail with admitting physician including history, physical 

exam and ancillary studies.


Admitting physician has assumed care for the patient, will follow all pending 

diagnostics and will complete the evaluation and treatment. 





06/05/18 16:29


case dw dr. mendez


agree with plan, states her hbg is within realm of expectation after b/l rahul 

surgery


if continues to trend lower will transfuse











*DC/Admit/Observation/Transfer


Diagnosis at time of Disposition: 


 Hypokalemia





Anemia


Qualifiers:


 Anemia type: unspecified type Qualified Code(s): D64.9 - Anemia, unspecified








- Discharge Dispostion


Condition at time of disposition: Stable


Decision to Admit order: Yes





- Referrals





- Patient Instructions





- Post Discharge Activity

## 2018-06-05 NOTE — HP
CHIEF COMPLAINT: low Hgb





PCP: Case





HISTORY OF PRESENT ILLNESS:


This is a 73 year old female with a past medical history of HTN, Rahul B/L knee 

surgery 18 who was DC to [place] yesterday and sent back today for low H/H. 

Hgb 7.0 at [place]. Pt reports dizziness upon sitting after walking with PT 

today. No further episodes of dizziness since arrival in ED. Pt reports she has 

not had a BM since surgery.





ER course was notable for:


(1) Hgb 7.6





Recent Travel:


pt denies





PAST MEDICAL HISTORY:


HTN, HLD, skin CA, hypothyroidism


PAST SURGICAL HISTORY:


B/L rahul knee replacement 18


Mohs L ear, L forehead


thyroidectomy





Social History:


Smoking: pt denies


Alcohol: a few drinks 2 x /week


Drugs: pt denies





Family History:


mother deceassed age 91, h/o OA, HTN


father  80s, colon CA


brother  age 29, Hodkins lymphoma


brother alive with HTN





Allergies


adhesive tape Allergy (Intermediate, Verified 18 13:18)


 Rash


Penicillins Allergy (Intermediate, Verified 18 13:18)


 Rash


Sulfa (Sulfonamide Antibiotics) Allergy (Intermediate, Verified 18 13:18)


 Rash





HOME MEDICATIONS:


 3





 Medication  Instructions  Recorded


 


Ascorbic Acid [Vitamin C] 1,000 mg PO DAILY 18


 


Cholecalciferol (Vitamin D3) 2,000 unit PO DAILY 18





[Vitamin D3]  


 


Levothyroxine Sodium [Synthroid] 137 mcg PO DAILY 18


 


Metoprolol Succinate [Toprol Xl] 50 mg PO DAILY 18


 


Multivit-Min/Iron/Folic/Lutein 1 each PO DAILY 18





[Centrum Silver Women Tablet]  


 


Simvastatin 20 mg PO HS 18


 


Ubidecarenone/Vit E Acet [Co Q-10 1 each PO DAILY 18





100 mg Softgel]  


 


Acetaminophen [Tylenol .Regular 650 mg PO Q6H  tablet 18





Strength -]  


 


Ascorbic Acid [Vitamin C -] 500 mg PO BID  tablet 18


 


Aspirin [ASA -] 325 mg PO BID #60 tablet 18


 


Pantoprazole Sodium [Protonix -] 40 mg PO DAILY #30 tablet.ec 18


 


oxyCODONE HCL [Roxicodone -] 5 mg PO Q6H PRN #0 tablet MDD 4 18








REVIEW OF SYSTEMS


CONSTITUTIONAL: 


Absent:  fever, chills, diaphoresis, generalized weakness, malaise, loss of 

appetite, weight change


HEENT: 


Absent:  rhinorrhea, nasal congestion, throat pain, throat swelling, difficulty 

swallowing, mouth swelling, ear pain, eye pain, visual changes


CARDIOVASCULAR: 


Absent: chest pain, syncope, palpitations, irregular heart rate, lightheadedness

, peripheral edema


RESPIRATORY: 


Absent: cough, shortness of breath, dyspnea with exertion, orthopnea, wheezing, 

stridor, hemoptysis


GASTROINTESTINAL:


Absent: abdominal pain, abdominal distension, nausea, vomiting, diarrhea, 

constipation, melena, hematochezia


GENITOURINARY: 


Absent: dysuria, frequency, urgency, hesitancy, hematuria, flank pain, genital 

pain


MUSCULOSKELETAL: 


Absent: myalgia, arthralgia, joint swelling, back pain, neck pain


SKIN: 


Absent: rash, itching, pallor


HEMATOLOGIC/IMMUNOLOGIC: 


Absent: easy bleeding, easy bruising, lymphadenopathy, frequent infections


ENDOCRINE:


Absent: unexplained weight gain, unexplained weight loss, heat intolerance, 

cold intolerance


NEUROLOGIC: Present: dizziness


Absent: headache, focal weakness or paresthesias, unsteady gait, seizure, 

mental status changes, bladder or bowel incontinence


PSYCHIATRIC: 


Absent: anxiety, depression, suicidal or homicidal ideation, hallucinations.








PHYSICAL EXAMINATION


Vital Signs - 24 hr





 3





  18





  13:14 18:39


 


Temperature 99 F 98.8 F


 


Pulse Rate 96 H 101 H


 


Respiratory 20 20





Rate  


 


Blood Pressure 118/54 123/56


 


O2 Sat by Pulse 99 





Oximetry (%)  








GENERAL: Awake, alert, and fully oriented, in no acute distress.


HEAD: Normal with no signs of trauma.


EYES: Pupils equal, round and reactive to light, extraocular movements intact, 

sclera anicteric, conjunctiva clear, pale. No lid lag.


EARS, NOSE, THROAT: Ears normal, nares patent, oropharynx clear without 

exudates. Moist mucous membranes.


NECK: Normal range of motion, supple without lymphadenopathy, JVD, or masses.


LUNGS: Breath sounds equal, clear to auscultation bilaterally. No wheezes, and 

no crackles. No accessory muscle use.


HEART: Regular rate and rhythm, normal S1 and S2 without murmur, rub or gallop.


ABDOMEN: Soft, nontender, not distended, normoactive bowel sounds, no guarding, 

no rebound, no masses.  No hepatomegaly or  splenomegaly. 


MUSCULOSKELETAL: Normal range of motion at all joints. No bony deformities or 

tenderness. No CVA tenderness. B/L knees with aquacell dressing intact


UPPER EXTREMITIES: 2+ pulses, warm, well-perfused. No cyanosis. No clubbing. No 

peripheral edema.


LOWER EXTREMITIES: 2+ pulses, warm, well-perfused. No calf tenderness. No 

peripheral edema. 


NEUROLOGICAL:  Cranial nerves II-XII intact. Normal speech. Normal gait.


PSYCHIATRIC: Cooperative. Good eye contact. Appropriate mood and affect.


SKIN: Warm, dry, normal turgor, no rashes or lesions noted, normal capillary 

refill. 





Laboratory Results - last 24 hr





 3





  18





  14:17 14:17 14:17


 


WBC  6.9  


 


RBC  2.37 L  


 


Hgb  7.6 L D  


 


Hct  22.0 L D  


 


MCV  93.1  


 


MCH  31.9  


 


MCHC  34.3  


 


RDW  13.3  


 


Plt Count  278  


 


MPV  7.4 L  


 


Absolute Neuts (auto)  5.5  


 


Neutrophils %  78.6  


 


Lymphocytes %  12.8  


 


Monocytes %  7.8  


 


Eosinophils %  0.5  


 


Basophils %  0.3  


 


PT with INR   12.1 


 


INR   1.08 


 


Sodium   130 L 


 


Potassium   3.2 L 


 


Chloride   99 


 


Carbon Dioxide   24 


 


Anion Gap   7 L 


 


BUN   24 H 


 


Creatinine   1.0 


 


Creat Clearance w eGFR   54.35 


 


Random Glucose   122 H 


 


Calcium   7.5 L 


 


Total Bilirubin   < 0.5 


 


AST   30  D 


 


ALT   17  D 


 


Alkaline Phosphatase   49 


 


Total Protein   5.4 L 


 


Albumin   2.7 L 


 


Blood Type    O POSITIVE


 


Antibody Screen    Negative








ECG


sinus tachycardia


vent rate 103, 


nonspecifict ST/T changes


no old ECG available for comparison








ASSESSMENT/PLAN:


73yF with PMH HTN, HLD, skin CA, hypothyroidism, B/L rahul TKR 18 presented 

to the ED for Hgb 7.0.





anemia


   - repeat Hgb here7.6. will not transfuse at this time


   - repeat CBC 11pm, 6am


   - transfuse if less than 7





hyponatremia


   - was restarted on HCTZ at [facility], will hold


   - repeat BMP in am





HTN


   - pt reports her BP was low at the [facility]


   - cont toprol, hold if SBP less than 100


   - hold lisinopril HCTZ





HLD


   - cont home simvastatin





hypothyroid


    - cont home synthroid





DVT PPX


   - defer heparin, anticipated LOS < 48h





FEN


   - tolerating po


   - BMP in am


   - regular diet as tolerated





Dispo: pt currently requires further observation for management of her emergent 

condition.








Hospitalist Screening





- Colonoscopy Questionnaire


Colonoscopy Questionnaire: 





Colonoscopy Questionnaire

## 2018-06-06 VITALS — HEART RATE: 104 BPM | SYSTOLIC BLOOD PRESSURE: 131 MMHG | DIASTOLIC BLOOD PRESSURE: 61 MMHG | TEMPERATURE: 98.9 F

## 2018-06-06 LAB
ANION GAP SERPL CALC-SCNC: 6 MMOL/L (ref 8–16)
BUN SERPL-MCNC: 20 MG/DL (ref 7–18)
CALCIUM SERPL-MCNC: 8.1 MG/DL (ref 8.4–10.2)
CHLORIDE SERPL-SCNC: 103 MMOL/L (ref 98–107)
CO2 SERPL-SCNC: 26 MMOL/L (ref 22–28)
CREAT SERPL-MCNC: 0.8 MG/DL (ref 0.6–1.3)
DEPRECATED RDW RBC AUTO: 13.3 % (ref 11.6–15.6)
GLUCOSE SERPL-MCNC: 159 MG/DL (ref 74–106)
HCT VFR BLD CALC: 22.6 % (ref 32.4–45.2)
HGB BLD-MCNC: 7.8 GM/DL (ref 10.7–15.3)
MAGNESIUM SERPL-MCNC: 2.2 MG/DL (ref 1.8–2.4)
MCH RBC QN AUTO: 32.2 PG (ref 25.7–33.7)
MCHC RBC AUTO-ENTMCNC: 34.4 G/DL (ref 32–36)
MCV RBC: 93.5 FL (ref 80–96)
PHOSPHATE SERPL-MCNC: 3.2 MG/DL (ref 2.5–4.6)
PLATELET # BLD AUTO: 290 K/MM3 (ref 134–434)
PMV BLD: 6.8 FL (ref 7.5–11.1)
POTASSIUM SERPLBLD-SCNC: 3.9 MMOL/L (ref 3.5–5.1)
RBC # BLD AUTO: 2.42 M/MM3 (ref 3.6–5.2)
SODIUM SERPL-SCNC: 135 MMOL/L (ref 136–145)
WBC # BLD AUTO: 6.5 K/MM3 (ref 4–10.8)

## 2018-06-06 RX ADMIN — ACETAMINOPHEN SCH MG: 325 TABLET ORAL at 13:20

## 2018-06-06 RX ADMIN — ACETAMINOPHEN SCH MG: 325 TABLET ORAL at 06:39

## 2018-06-06 RX ADMIN — ACETAMINOPHEN SCH: 325 TABLET ORAL at 02:00

## 2018-06-06 NOTE — EKG
Test Reason : 

Blood Pressure : ***/*** mmHG

Vent. Rate : 103 BPM     Atrial Rate : 103 BPM

   P-R Int : 134 ms          QRS Dur : 098 ms

    QT Int : 358 ms       P-R-T Axes : 033 021 003 degrees

   QTc Int : 468 ms

 

SINUS TACHYCARDIA

NONSPECIFIC ST AND T WAVE ABNORMALITY

ABNORMAL ECG

NO PREVIOUS ECGS AVAILABLE

Confirmed by FARNAZ MARCANO MD ([id number]) on 6/6/2018 9:59:58 AM

 

Referred By: DR TURK           Confirmed By:FARNAZ MARCANO MD

## 2018-06-06 NOTE — DS
Physical Exam: 


SUBJECTIVE: Patient seen and examined, patient is sitting in bedside recliner 

reports feeling well, denies any dizziness, chest pain or shortness of breath.  








OBJECTIVE:This is a 73 year old female with a past medical history of HTN, [place] 

B/L knee surgery 6/1/18 who was DC to [place] yesterday and sent back today for 

low H/H. Hgb 7.0 at [place]. Pt reports dizziness upon sitting after walking with 

PT today. No further episodes of dizziness since arrival in ED. Pt reports she 

has not had a BM since surgery.





ER course was notable for:


(1) Hgb 7.6








 Vital Signs











 Period  Temp  Pulse  Resp  BP Sys/Grewal  Pulse Ox


 


 Last 24 Hr  98.4 F-99.1 F    18-20  114-134/49-64  97-99








PHYSICAL EXAM





GENERAL: The patient is awake, alert, and fully oriented, in no acute distress.


HEAD: Normal with no signs of trauma.


EYES: PERRL, extraocular movements intact, sclera anicteric, conjunctiva clear. 


ENT: Ears normal, nares patent, oropharynx clear without exudates, moist mucous 

membranes.


NECK: Trachea midline, full range of motion, supple. 


LUNGS: Breath sounds equal, clear to auscultation bilaterally, no wheezes, no 

crackles, no accessory muscle use. 


HEART: Regular rate and rhythm, S1, S2 without murmur, rub or gallop.


ABDOMEN: Soft, nontender, nondistended, normoactive bowel sounds, no guarding, 

no rebound, no hepatosplenomegaly, no masses.


EXTREMITIES: 2+ pulses, warm, well-perfused, no edema. 


RIGHT LOWER EXTREMITY: aguacel dressing removed, by orthopedistDr Willoughby 

surgical site well approximated no erythema, dried blood noted, less than 3 

second cappillary refill, + 3 pedal pulse, wound cleaned with betadine


LEFT LOWER EXTREMITY: aguacel dressing removed by orthopedist, Dr Willoughby, 

surgical site well approximated, no erythema, dried blood noted, no drainage 

less than 3 second capillary refill, + pedal pulse, wound cleansed with 

betadine 


NEUROLOGICAL: Cranial nerves II through XII grossly intact. Normal speech, gait 

not observed.


PSYCH: Normal mood, normal affect.


SKIN: Warm, dry, normal turgor, no rashes or lesions noted.





LABS


 Laboratory Results - last 24 hr











  06/05/18 06/05/18 06/05/18





  14:17 14:17 14:17


 


WBC  6.9  


 


RBC  2.37 L  


 


Hgb  7.6 L D  


 


Hct  22.0 L D  


 


MCV  93.1  


 


MCH  31.9  


 


MCHC  34.3  


 


RDW  13.3  


 


Plt Count  278  


 


MPV  7.4 L  


 


Absolute Neuts (auto)  5.5  


 


Neutrophils %  78.6  


 


Lymphocytes %  12.8  


 


Monocytes %  7.8  


 


Eosinophils %  0.5  


 


Basophils %  0.3  


 


PT with INR   


 


INR   


 


Sodium   130 L 


 


Potassium   3.2 L 


 


Chloride   99 


 


Carbon Dioxide   24 


 


Anion Gap   7 L 


 


BUN   24 H 


 


Creatinine   1.0 


 


Creat Clearance w eGFR   54.35 


 


Random Glucose   122 H 


 


Calcium   7.5 L 


 


Phosphorus   


 


Magnesium   


 


Total Bilirubin   < 0.5 


 


AST   30  D 


 


ALT   17  D 


 


Alkaline Phosphatase   49 


 


Total Protein   5.4 L 


 


Albumin   2.7 L 


 


Blood Type    O POSITIVE


 


Antibody Screen    Negative














  06/05/18 06/05/18 06/05/18





  14:17 14:25 22:55


 


WBC    6.3


 


RBC    2.31 L


 


Hgb    7.4 L


 


Hct    21.5 L


 


MCV    92.9


 


MCH    32.2


 


MCHC    34.6


 


RDW    13.2


 


Plt Count    267


 


MPV    7.5


 


Absolute Neuts (auto)    4.4


 


Neutrophils %    68.8


 


Lymphocytes %    19.5


 


Monocytes %    10.1


 


Eosinophils %    1.2


 


Basophils %    0.4


 


PT with INR  12.1  


 


INR  1.08  


 


Sodium   


 


Potassium   


 


Chloride   


 


Carbon Dioxide   


 


Anion Gap   


 


BUN   


 


Creatinine   


 


Creat Clearance w eGFR   


 


Random Glucose   


 


Calcium   


 


Phosphorus   


 


Magnesium   


 


Total Bilirubin   


 


AST   


 


ALT   


 


Alkaline Phosphatase   


 


Total Protein   


 


Albumin   


 


Blood Type   O POSITIVE 


 


Antibody Screen   














  06/06/18 06/06/18





  09:30 09:30


 


WBC  6.5 


 


RBC  2.42 L 


 


Hgb  7.8 L 


 


Hct  22.6 L 


 


MCV  93.5 


 


MCH  32.2 


 


MCHC  34.4 


 


RDW  13.3 


 


Plt Count  290 


 


MPV  6.8 L 


 


Absolute Neuts (auto)  


 


Neutrophils %  


 


Lymphocytes %  


 


Monocytes %  


 


Eosinophils %  


 


Basophils %  


 


PT with INR  


 


INR  


 


Sodium   135 L


 


Potassium   3.9  D


 


Chloride   103


 


Carbon Dioxide   26


 


Anion Gap   6 L


 


BUN   20 H


 


Creatinine   0.8


 


Creat Clearance w eGFR  


 


Random Glucose   159 H D


 


Calcium   8.1 L


 


Phosphorus   3.2


 


Magnesium   2.2


 


Total Bilirubin  


 


AST  


 


ALT  


 


Alkaline Phosphatase  


 


Total Protein  


 


Albumin  


 


Blood Type  


 


Antibody Screen  











HOSPITAL COURSE:


1) post operative anemia.  Repeat Hgb 7.8, discussed, patient is asymptomatic, 

discussed with Dr Willoughby, will not transfuse at this time.  





2) hyponatremia, likely secondary to HCTZ, continue to  hold, serum sodium 

improved. 


   


3) HTN, blood pressure remained at goal, continue toprol and lisinopril.  hold 

hctz due to hyponatremia. 


   


4) hypothyroid  cont home synthroid





PLAN


- discharge to short term rehab


- continue to hold hctz, repeat hgb and bmp in 5 days








Date of Admission:06/05/18





Date of Discharge: 06/06/18














Discharge Summary


Reason For Visit: ANEMIA/HYPOKALEMIA


Current Active Problems





Anemia (Acute)


Hypokalemia (Acute)








Condition: Stable





- Instructions





- Home Medications


Comprehensive Discharge Medication List: 


Ambulatory Orders





Ascorbic Acid [Vitamin C] 1,000 mg PO DAILY 05/24/18 


Cholecalciferol (Vitamin D3) [Vitamin D3] 2,000 unit PO DAILY 05/24/18 


Levothyroxine Sodium [Synthroid] 137 mcg PO DAILY 05/24/18 


Metoprolol Succinate [Toprol Xl] 50 mg PO DAILY 05/24/18 


Multivit-Min/Iron/Folic/Lutein [Centrum Silver Women Tablet] 1 each PO DAILY 05/ 24/18 


Simvastatin 20 mg PO HS 05/24/18 


Ubidecarenone/Vit E Acet [Co Q-10 100 mg Softgel] 1 each PO DAILY 05/24/18 


Acetaminophen [Tylenol .Regular Strength -] 650 mg PO Q6H  tablet 06/04/18 


Ascorbic Acid [Vitamin C -] 500 mg PO BID  tablet 06/04/18 


Aspirin [ASA -] 325 mg PO BID #60 tablet 06/04/18 


Pantoprazole Sodium [Protonix -] 40 mg PO DAILY #30 tablet.ec 06/04/18 


oxyCODONE HCL [Roxicodone -] 5 mg PO Q6H PRN #0 tablet MDD 4 06/04/18

## 2018-06-06 NOTE — PATH
Surgical Pathology Report



Patient Name:  STELLA DAO

Accession #:  

Med. Rec. #:  [id number]                                                        

   /Age/Gender:  1945 (Age: 73) / F

Account:  [id number]                                                          

             Location: [facility]

Taken:  2018

Received:  2018

Reported:  2018

Physicians:  Toy Willoughby M.D.

  



Specimen(s) Received

A: LEFT KNEE BONE 

B: RIGHT KNEE BONE 





Clinical History

Bilateral knees osteoarthritis







Final Diagnosis

A. LEFT KNEE BONE, RESECTION:

DEGENERATIVE JOINT DISEASE, LEFT KNEE.



B. RIGHT KNEE BONE, RESECTION:

DEGENERATIVE JOINT DISEASE, RIGHT KNEE.







***Electronically Signed***

Georges Kingsley M.D.





Gross Description

A. Received in formalin labeled "left knee bone," is a 13.5 x 11.0 x 1.6 cm

aggregate of multiple portions of bone and soft tissue. The tibial plateau

measures 8.4 x 5.5 x 1.8 cm. There are multiple areas of eburnation present,

measuring up to 1.4 cm in greatest dimension. The remaining articular surfaces

are tan-yellow and diffusely granular. The underlying trabecular bone is yellow

and hard. Representative sections are submitted in one cassette, following

decalcification.



B. Received in formalin labeled "right knee bone," is a 13.0 x 10.0 x 2.5 cm

aggregate of multiple portions of bone and soft tissue. The tibial plateau

measures 8.0 x 5.4 x 1.5 cm. There are multiple areas of eburnation present,

measuring up to 1.5 cm in greatest dimension. The remaining articular surfaces

are tan-yellow and diffusely granular. The underlying trabecular bone is yellow

and hard. Representative sections are submitted in one cassette, following

decalcification.

/2018

## 2023-06-28 DIAGNOSIS — M17.0 BILATERAL PRIMARY OSTEOARTHRITIS OF KNEE: ICD-10-CM

## 2023-06-28 PROBLEM — Z00.00 ENCOUNTER FOR PREVENTIVE HEALTH EXAMINATION: Status: ACTIVE | Noted: 2023-06-28

## 2023-06-30 ENCOUNTER — RESULT REVIEW (OUTPATIENT)
Age: 78
End: 2023-06-30

## 2023-06-30 ENCOUNTER — APPOINTMENT (OUTPATIENT)
Dept: ORTHOPEDIC SURGERY | Facility: CLINIC | Age: 78
End: 2023-06-30
Payer: MEDICARE

## 2023-06-30 VITALS — HEIGHT: 68 IN | WEIGHT: 220 LBS | BODY MASS INDEX: 33.34 KG/M2

## 2023-06-30 DIAGNOSIS — M16.11 UNILATERAL PRIMARY OSTEOARTHRITIS, RIGHT HIP: ICD-10-CM

## 2023-06-30 PROCEDURE — 99214 OFFICE O/P EST MOD 30 MIN: CPT

## 2023-06-30 NOTE — HISTORY OF PRESENT ILLNESS
[de-identified] : Patient comes in with more than 6 months of hip knee pain atraumatic in onset.  Patient has tried greater than 6 months of nsaids, physical therapy and doctor directed exercises and injections.  Patient continues to have pain that is 8/10 in severity and interferes with activities of daily living such as putting on shoes and socks, walking two blocks, and getting up and down from a chair and toilet.  hip osteoarthritis outcome score is 8.2

## 2023-06-30 NOTE — PHYSICAL EXAM
[de-identified] : painful restricted rom right hip\par nvid\par no ttp\par skin intact [de-identified] : hip xrays shows severe arthritis with bone on bone contact, subchondral sclerosis and osteophytes and cysts right

## 2023-06-30 NOTE — DISCUSSION/SUMMARY
[de-identified] : we discussed right danial\par she needs cataracts before\par i told her she needs to loose weight or i wouldn’t do it